# Patient Record
(demographics unavailable — no encounter records)

---

## 2025-05-07 NOTE — ASSESSMENT
[FreeTextEntry1] :  The patient is being seen today for her right hand.  Mom states was born with a larger space between her middle and ring finger. She has no issue with function of the hand. She never had any x-rays of the hand. She does not favor one hand over the other. She has no developmental issues. She is accompanied by her mother.  right hand: Hand position with the long finger radially deviating towards the index finger, full range of motion of fingers, able to pinch, nontender palpation anywhere along the hand, no significant instability, neurovascular intact  x-ray hand 3 views negative  We discussed that she seems to be keeping her index and long finger close together as opposed to having a large space in between the long finger and ring finger. We discussed that she tends to use her index and long fingers together and keeps the long finger towards the index and sometimes even overlaps it. We discussed the possibility that she has a slight ligament deficiency causing her to cross the long and index fingers. I do not recommend any surgical intervention at this time as she has no functional issues. I will discuss the case with my colleagues as well, her mother agreed to the plan. Photo documentation of the right hand was taken today.  I will be in touch with mom in the near future.